# Patient Record
Sex: FEMALE | Employment: STUDENT | URBAN - METROPOLITAN AREA
[De-identification: names, ages, dates, MRNs, and addresses within clinical notes are randomized per-mention and may not be internally consistent; named-entity substitution may affect disease eponyms.]

---

## 2019-09-11 ENCOUNTER — HOSPITAL ENCOUNTER (OUTPATIENT)
Dept: RADIOLOGY | Facility: HOSPITAL | Age: 24
Discharge: HOME | End: 2019-09-11
Attending: ORTHOPAEDIC SURGERY
Payer: COMMERCIAL

## 2019-09-11 ENCOUNTER — OFFICE VISIT (OUTPATIENT)
Dept: SPORTS MEDICINE | Facility: CLINIC | Age: 24
End: 2019-09-11
Payer: COMMERCIAL

## 2019-09-11 VITALS — HEART RATE: 66 BPM | DIASTOLIC BLOOD PRESSURE: 74 MMHG | SYSTOLIC BLOOD PRESSURE: 116 MMHG

## 2019-09-11 DIAGNOSIS — M79.671 RIGHT FOOT PAIN: ICD-10-CM

## 2019-09-11 DIAGNOSIS — G89.29 CHRONIC PAIN OF RIGHT ANKLE: ICD-10-CM

## 2019-09-11 DIAGNOSIS — R93.89 ABNORMAL FINDINGS ON DIAGNOSTIC IMAGING OF OTHER SPECIFIED BODY STRUCTURES: ICD-10-CM

## 2019-09-11 DIAGNOSIS — S93.491A SPRAIN OF ANTERIOR TALOFIBULAR LIGAMENT OF RIGHT ANKLE, INITIAL ENCOUNTER: Primary | ICD-10-CM

## 2019-09-11 DIAGNOSIS — M79.671 RIGHT FOOT PAIN: Primary | ICD-10-CM

## 2019-09-11 DIAGNOSIS — M25.571 CHRONIC PAIN OF RIGHT ANKLE: ICD-10-CM

## 2019-09-11 PROCEDURE — 99999 PR PBB SHADOW E&M-NEW PATIENT-LVL III: CPT | Mod: PBBFAC,,, | Performed by: ORTHOPAEDIC SURGERY

## 2019-09-11 PROCEDURE — 99204 PR OFFICE/OUTPT VISIT, NEW, LEVL IV, 45-59 MIN: ICD-10-PCS | Mod: S$GLB,,, | Performed by: ORTHOPAEDIC SURGERY

## 2019-09-11 PROCEDURE — 99999 PR PBB SHADOW E&M-NEW PATIENT-LVL III: ICD-10-PCS | Mod: PBBFAC,,, | Performed by: ORTHOPAEDIC SURGERY

## 2019-09-11 PROCEDURE — 99204 OFFICE O/P NEW MOD 45 MIN: CPT | Mod: S$GLB,,, | Performed by: ORTHOPAEDIC SURGERY

## 2019-09-11 RX ORDER — MELOXICAM 15 MG/1
15 TABLET ORAL DAILY
Qty: 30 TABLET | Refills: 6 | Status: SHIPPED | OUTPATIENT
Start: 2019-09-11 | End: 2019-10-11

## 2019-09-11 RX ORDER — CELECOXIB 200 MG/1
200 CAPSULE ORAL 2 TIMES DAILY
Qty: 60 CAPSULE | Refills: 2 | Status: CANCELLED | OUTPATIENT
Start: 2019-09-11

## 2019-09-11 NOTE — PROGRESS NOTES
Subjective:          Chief Complaint: Paris Tomlin is a 23 y.o. female who had concerns including Pain of the Right Foot.    Ms. Tomlin is a 23-year-old  from [place].  She presents for evaluation of right ankle pain.  She reports that she sprained her right ankle 2 years ago, while [place] at [employer], and has been having dull pain over the anterolateral ankle ever since then.  She denies any recurrence ankle sprains.  She reports that she has pain after playing tennis or running more than 1.5 hr.  She has tried physical therapy exercises for the past year with her , which has not helped any.  She has not tried any anti-inflammatory medications. She had 1 shot in the area of the ATFL in August of this year.  The shot did not help.  She denies any knee or groin pain.  Denies any calf pain. She had an MRI and x-rays performed at [facility] in 4, presents today for evaluation of this right ankle pain.  Has been wearing CAM boot.        Review of Systems   Constitution: Negative.   HENT: Negative.    Eyes: Negative.    Cardiovascular: Negative.    Respiratory: Negative.    Endocrine: Negative.    Hematologic/Lymphatic: Negative.    Skin: Negative.    Musculoskeletal: Positive for joint pain.   Gastrointestinal: Negative.    Genitourinary: Negative.    Neurological: Negative.    Psychiatric/Behavioral: Negative.    Allergic/Immunologic: Negative.                    Objective:        General: Paris is well-developed, well-nourished, appears stated age, in no acute distress, alert and oriented to time, place and person.     General    Vitals reviewed.  Constitutional: She is oriented to person, place, and time. She appears well-developed and well-nourished. No distress.   HENT:   Mouth/Throat: No oropharyngeal exudate.   Eyes: Right eye exhibits no discharge. Left eye exhibits no discharge.   Neck: Normal range of motion.   Cardiovascular: Normal rate.    Pulmonary/Chest: Breath  sounds normal. No respiratory distress.   Neurological: She is alert and oriented to person, place, and time. She has normal reflexes. No cranial nerve deficit. Coordination normal.   Psychiatric: She has a normal mood and affect. Her behavior is normal. Judgment and thought content normal.     General Musculoskeletal Exam   Gait: normal     Right Ankle/Foot Exam     Inspection   Scars: absent  Deformity: absent  Erythema: absent  Bruising: Ankle - absent Foot - absent  Effusion: Ankle - absent Foot - absent  Atrophy: Ankle - absent Foot - absent    Pain   The patient exhibits pain of the anterior talofibular ligament.    Range of Motion   Ankle Joint   Dorsiflexion:  10 normal   Plantar flexion:  45 normal   Subtalar Joint   Inversion:  15 normal   Eversion:  5 normal   Sanchez Test:  negative  First MTP Joint: normal    Alignment   Knee Alignment: neutral  Hindfoot Alignment: neutral  Midfoot Alignment: normal  Forefoot Alignment: normal    Tests   Anterior drawer: 1+  Varus tilt: 1+  Heel Walk: able to perform  Tiptoe Walk: able to perform  Single Heel Rise: able to perform  External Rotation Test: negative  Squeeze Test: negative    Other   Ankle Crepitus: absent  Foot Crepitus:  absent  Sensation: normal  Peroneal Subluxation: negative    Comments:  TTP over the anterolateral talar dome    Left Ankle/Foot Exam     Inspection  Deformity: absent  Erythema: absent  Bruising: Ankle - absent Foot - absent  Effusion: Ankle - absent Foot - absent  Atrophy: Ankle - absent Foot - absent  Scars: absent    Range of Motion   Ankle Joint  Dorsiflexion:  10 normal   Plantar flexion:  35 normal     Subtalar Joint   Inversion:  15 normal   Eversion:  5 normal   Sanchez Test:  normal  First MTP Joint: normal    Alignment   Knee Alignment: neutral  Hindfoot Alignment: neutral  Midfoot Alignment: normal  Forefoot Alignment: normal    Tests   Anterior drawer: 1+  Varus tilt: 1+  Heel Walk: able to perform  Tiptoe Walk: able to  perform  Single Heel Rise: able to perform  External Rotation Test: negative  Squeeze Test: absent    Other   Foot Crepitus:  absent  Ankle Crepitus: absent  Sensation: normal  Peroneal Subluxation: negative      Muscle Strength   Right Lower Extremity   Anterior tibial:  5/5/5  Posterior tibial:  5/5/5  Gastrocsoleus:  5/5/5  Peroneal muscle:  5/5/5  EHL:  5/5  FDL: 5/5  EDL: 5/5  FHL: 5/5  Left Lower Extremity   Anterior tibial:  5/5/5   Posterior tibial:  5/5/5  Gastrocsoleus:  5/5/5  Peroneal muscle:  5/5/5  EHL:  5/5  FDL: 5/5  EDL: 5/5  FHL: 5/5    Reflexes     Left Side  Post Tibial:  2+  Achilles:  2+  Plantar Reflex: 2+    Right Side   Post Tibial:  2+  Achilles:  2+  Plantar Reflex: 2+    Vascular Exam     Right Pulses  Dorsalis Pedis:      2+  Posterior Tibial:      2+        Left Pulses  Dorsalis Pedis:      2+  Posterior Tibial:      2+        Edema  Right Lower Leg: absent  Left Lower Leg: absent    Radiographs ordered and reviewed today in clinic of the right ankle demonstrates no fracture, dislocation, swelling or degenerative changes noted.           Assessment:       Encounter Diagnoses   Name Primary?    Sprain of anterior talofibular ligament of right ankle, initial encounter Yes    Chronic pain of right ankle           Plan:         1. Ankle/foot function, SF-12 was filled out today in clinic.     RTC in 1 weeks w/Dr. Camarena for MRI review. Patient will not fill out Ankle/foot function, SF-12 on return.    2.  MRI of the Right ankle ordered    3.  Custom orthotics prescribed    4.  Mobic 15 mg QD prescribed              [facility] patient questionnaires have been collected today.

## 2019-09-11 NOTE — LETTER
Patient: Paris Tomlin   YOB: 1995   Clinic Number: [id number]   Today's Date: September 11, 2019        Certificate to Return to Sport/PE     Paris Dietrich was seen by Herrera Camarena MD on 9/11/2019.    Follow up RTC in 1 weeks w/Dr. Camarena for MRI review. Patient will not fill out Ankle/foot function, SF-12 on r, for RTC in 1 weeks w/Dr. Camarena for MRI review. Patient will not fill out Ankle/foot function, SF-12 on r. Paris Dietrich will be seen by Dr. Herrera Camarena.    Paris is to be withheld from activities until clear from physician.     Comments:     If you have any questions or concerns, please feel free to contact the office at [phone].    Thank you.    Herrera Camarena MD      Signature: __________________________________________________

## 2019-09-16 ENCOUNTER — TELEPHONE (OUTPATIENT)
Dept: SPORTS MEDICINE | Facility: CLINIC | Age: 24
End: 2019-09-16

## 2019-09-17 ENCOUNTER — TELEPHONE (OUTPATIENT)
Dept: SPORTS MEDICINE | Facility: CLINIC | Age: 24
End: 2019-09-17

## 2019-09-17 NOTE — TELEPHONE ENCOUNTER
Spoke to the patient and scheduled MRI for 5:45pm 9/19    ----- Message from Chanelle Lange sent at 9/17/2019 10:24 AM CDT -----  Contact: patient   Attn Tyra    Please call pt at [phone]    Patient is returning your call and requesting the 545 pm on this Thursday     Thank you

## 2019-09-17 NOTE — TELEPHONE ENCOUNTER
Spoke to the patient and tried to rescheduled her MRI. Pt requested to call back once she talks to her .    ----- Message from Crystal Rees MA sent at 9/13/2019  2:40 PM CDT -----  Regarding: reschedule MRI  Jesus called and found out a claim was submitted by [employer] for this injury so she can use the [employer] athletic insurance as the secondary. Please call to reschedule her MRIs.    Crystal Rees MA  [facility]

## 2019-09-20 ENCOUNTER — TELEPHONE (OUTPATIENT)
Dept: SPORTS MEDICINE | Facility: CLINIC | Age: 24
End: 2019-09-20

## 2019-10-21 DIAGNOSIS — S93.491A SPRAIN OF ANTERIOR TALOFIBULAR LIGAMENT OF RIGHT ANKLE, INITIAL ENCOUNTER: Primary | ICD-10-CM

## 2019-10-21 DIAGNOSIS — G89.29 CHRONIC PAIN OF TOE OF RIGHT FOOT: ICD-10-CM

## 2019-10-21 DIAGNOSIS — M79.674 CHRONIC PAIN OF TOE OF RIGHT FOOT: ICD-10-CM

## 2019-10-28 ENCOUNTER — TELEPHONE (OUTPATIENT)
Dept: ORTHOPEDICS | Facility: CLINIC | Age: 24
End: 2019-10-28

## 2019-10-28 ENCOUNTER — TELEPHONE (OUTPATIENT)
Dept: SPORTS MEDICINE | Facility: CLINIC | Age: 24
End: 2019-10-28

## 2019-10-28 NOTE — TELEPHONE ENCOUNTER
Called patient to inform her that her MRI is still under review and her appointment for the MRI must be rescheduled.     I informed patient that her MRI is under clinical review but she currently has not notes to review with Dr. Bolaños so there is a chance of denial due to no prior appointments with Dr. Bolaños. Patient informed me that she will not be seeing Dr. Bolaños for treatment because she has a provider in [place], [place]. Patient asked for the MRI to be rescheduled for after Nov 4, 2019. MRI appointment rescheduled for November 5, 2019 at 10:00 AM at the [facility]. Patient voiced understanding of appointment date, time, and location.           Evi San Staff             Dear Provider,     We are working on obtaining authorization for CHILANGO NIELSEN who is scheduled on 10/29/2019 for a MRI FOOT (HINDFOOT) RIGHT WITHOUT CONTRAST . We will continue to work with the insurance company to obtain authorization but need to know if the procedure is considered medically urgent, if it is urgent what is the reason or if it can be rescheduled to a later date if authorization is not obtained in time. If you have any questions, I can be reached at [phone] or ext [phone]. If you decide to reschedule the pt's appt please move it past November 4th as that is the last day the health plan has to approve or deny the request.     Kind Regards,   [employer]   Pre Service   [id number]

## 2019-10-28 NOTE — LETTER
October 28, 2019    Paris Tomlin  [address] [place] [address]             [address] [address]  Phone: [phone] To whom it may concern,    I am writing this letter on behalf of my patient Paris Tomlin whom I am treating for a right foot and ankle condition. I deem it medically necessary for her to receive an MRI of the right ankle for further evaluation. This is also considered a medically urgent study.    Please contact our office if you have any additional questions or concerns.    Thank you,    Herrera Camarena MD

## 2019-10-29 ENCOUNTER — HOSPITAL ENCOUNTER (OUTPATIENT)
Dept: RADIOLOGY | Facility: HOSPITAL | Age: 24
Discharge: HOME | End: 2019-10-29
Attending: ORTHOPAEDIC SURGERY
Payer: COMMERCIAL

## 2019-10-29 DIAGNOSIS — G89.29 CHRONIC PAIN OF TOE OF RIGHT FOOT: ICD-10-CM

## 2019-10-29 DIAGNOSIS — M79.674 CHRONIC PAIN OF TOE OF RIGHT FOOT: ICD-10-CM

## 2019-10-29 DIAGNOSIS — S93.491A SPRAIN OF ANTERIOR TALOFIBULAR LIGAMENT OF RIGHT ANKLE, INITIAL ENCOUNTER: ICD-10-CM

## 2019-10-29 PROCEDURE — 73718 MRI LOWER EXTREMITY W/O DYE: CPT | Mod: TC,RT

## 2019-10-29 PROCEDURE — 73718 MRI FOOT (HINDFOOT) RIGHT WITHOUT CONTRAST: ICD-10-PCS | Mod: 26,RT,, | Performed by: RADIOLOGY

## 2019-10-29 PROCEDURE — 73718 MRI LOWER EXTREMITY W/O DYE: CPT | Mod: 26,RT,, | Performed by: RADIOLOGY

## 2019-11-01 NOTE — PROGRESS NOTES
Subjective:        24F  at [facility] with a history of ankle pain returns for to discuss MRI results of right ankle/hindfoot    Chief Complaint: Paris Tomlin is a 24 y.o. female who had concerns including Follow-up and Pain of the Right Ankle.  Prior hx:   Ms. Tomlin is a 23-year-old  from [place].  She presents for evaluation of right ankle pain.  She reports that she sprained her right ankle 2 years ago, while playing at [employer], and has been having dull pain over the anterolateral ankle ever since then.  She denies any recurrence ankle sprains.  She reports that she has pain after playing tennis or running more than 1.5 hr.  She has tried physical therapy exercises for the past year with her , which has not helped any.  She has not tried any anti-inflammatory medications. She had 1 shot in the area of the ATFL in August of this year.  The shot did not help.  She denies any knee or groin pain.  Denies any calf pain. She had an MRI and x-rays performed at [facility] in 4, presents today for evaluation of this right ankle pain.  Has been wearing CAM boot.          Review of Systems   Constitution: Negative.   HENT: Negative.    Eyes: Negative.    Cardiovascular: Negative.    Respiratory: Negative.    Endocrine: Negative.    Hematologic/Lymphatic: Negative.    Skin: Negative.    Musculoskeletal: Positive for joint pain.   Gastrointestinal: Negative.    Genitourinary: Negative.    Neurological: Negative.    Psychiatric/Behavioral: Negative.    Allergic/Immunologic: Negative.                    Objective:        General: Paris is well-developed, well-nourished, appears stated age, in no acute distress, alert and oriented to time, place and person.     General    Vitals reviewed.  Constitutional: She is oriented to person, place, and time. She appears well-developed and well-nourished. No distress.   HENT:   Right Ear: External ear normal.   Left Ear: External ear  normal.   Nose: Nose normal.   Mouth/Throat: No oropharyngeal exudate.   Eyes: Pupils are equal, round, and reactive to light. Right eye exhibits no discharge. Left eye exhibits no discharge.   Neck: Normal range of motion.   Cardiovascular: Normal rate.    Pulmonary/Chest: Effort normal and breath sounds normal. No respiratory distress.   Abdominal: Soft.   Neurological: She is alert and oriented to person, place, and time. She has normal reflexes. No cranial nerve deficit. She exhibits normal muscle tone. Coordination normal.   Psychiatric: She has a normal mood and affect. Her behavior is normal. Judgment and thought content normal.     General Musculoskeletal Exam   Gait: normal     Right Ankle/Foot Exam     Inspection   Scars: absent  Deformity: absent  Erythema: absent  Bruising: Ankle - absent Foot - absent  Effusion: Ankle - absent Foot - absent  Atrophy: Ankle - absent Foot - absent    Pain   The patient exhibits pain of the anterior talofibular ligament.    Range of Motion   Ankle Joint   Dorsiflexion:  10 normal   Plantar flexion:  45 normal   Subtalar Joint   Inversion:  15 normal   Eversion:  5 normal   Sanchez Test:  negative  First MTP Joint: normal    Alignment   Knee Alignment: neutral  Hindfoot Alignment: neutral  Midfoot Alignment: normal  Forefoot Alignment: normal    Tests   Anterior drawer: 1+  Varus tilt: 1+  Heel Walk: able to perform  Tiptoe Walk: able to perform  Single Heel Rise: able to perform  External Rotation Test: negative  Squeeze Test: negative    Other   Ankle Crepitus: absent  Foot Crepitus:  absent  Sensation: normal  Peroneal Subluxation: negative    Comments:  TTP over the anterolateral talar dome    Left Ankle/Foot Exam     Inspection  Deformity: absent  Erythema: absent  Bruising: Ankle - absent Foot - absent  Effusion: Ankle - absent Foot - absent  Atrophy: Ankle - absent Foot - absent  Scars: absent    Range of Motion   Ankle Joint  Dorsiflexion:  10 normal   Plantar  flexion:  35 normal     Subtalar Joint   Inversion:  15 normal   Eversion:  5 normal   Sanchez Test:  normal  First MTP Joint: normal    Alignment   Knee Alignment: neutral  Hindfoot Alignment: neutral  Midfoot Alignment: normal  Forefoot Alignment: normal    Tests   Anterior drawer: 1+  Varus tilt: 1+  Heel Walk: able to perform  Tiptoe Walk: able to perform  Single Heel Rise: able to perform  External Rotation Test: negative  Squeeze Test: absent    Other   Foot Crepitus:  absent  Ankle Crepitus: absent  Sensation: normal  Peroneal Subluxation: negative      Right Hand/Wrist Exam     Inspection   Scars: Wrist - absent   Effusion: Wrist - absent   Bruising: Wrist - absent   Deformity: Wrist - deformity     Range of Motion     Wrist   Extension:  50 normal   Flexion:  70 normal   Abduction: 20 normal  Adduction: 35 normal    Tests   Phalens Sign: negative  Tinel's sign (median nerve): negative  Finkelstein's test: negative  Carpal Tunnel Compression Test: negative  Cubital Tunnel Compression Test: negative  LT Stability: negative  Birmingham's Test: negative      Other     Neuorologic Exam    Median Distribution: normal  Ulnar Distribution: normal  Radial Distribution: normal      Left Hand/Wrist Exam     Inspection   Scars: Wrist - absent   Effusion: Wrist - absent   Bruising: Wrist - absent   Deformity: Wrist - absent     Range of Motion     Wrist   Extension:  50 normal   Flexion:  70 normal   Abduction: 20 normal  Adduction: 35 normal    Tests   Phalens Sign: negative  Tinel's sign (median nerve): negative  Finkelstein's test: negative  Carpal Tunnel Compression Test: negative  Cubital Tunnel Compression Test: negative  LT Stability: negative  Birmingham's Test: negative      Other     Sensory Exam  Median Distribution: normal  Ulnar Distribution: normal  Radial Distribution: normal      Right Elbow Exam     Inspection   Scars: absent  Effusion: absent  Bruising: absent  Deformity: absent  Atrophy: absent    Range of  Motion   Extension:  0 normal   Flexion:  130 normal   Pronation: normal   Supination:  80 normal     Tests   Varus: negative  Valgus: negative  Tinel's sign (cubital tunnel): negative  Tennis Elbow: negative  Golfer's Elbow: negative  Radial Capitellar Grind: negative    Other   Sensation: normal      Left Elbow Exam     Inspection   Scars: absent  Effusion: absent  Bruising: absent  Deformity: absent  Atrophy: absent    Range of Motion   Extension:  0 normal   Flexion:  130 normal   Pronation: normal   Supination:  80 normal     Tests   Varus: negative  Tinel's sign (cubital tunnel): negative  Tennis Elbow: negative  Golfer's Elbow: negative  Radial Capitellar Grind: negative    Other   Sensation: normal        Muscle Strength   Right Upper Extremity   Wrist extension: 5/5/5   Wrist flexion: 5/5/5   : 5/5/5   Pinch Mechanism: 5/5  Elbow Pronation:  5/5   Elbow Supination:  5/5   Elbow Extension: 5/5  Elbow Flexion: 5/5  Intrinsics: 5/5  EPL (Extensor Pollicis Longus): 5/5  Left Upper Extremity  Wrist extension: 5/5/5   Wrist flexion: 5/5/5   :  5/5/5   Pinch Mechanism: 5/5  Elbow Pronation:  5/5   Elbow Supination:  5/5   Elbow Extension: 5/5  Elbow Flexion: 5/5  Intrinsics: 5/5  EPL (Extensor Pollicis Longus): 5/5  Right Lower Extremity   Anterior tibial:  5/5/5  Posterior tibial:  5/5/5  Gastrocsoleus:  5/5/5  Peroneal muscle:  5/5/5  EHL:  5/5  FDL: 5/5  EDL: 5/5  FHL: 5/5  Left Lower Extremity   Anterior tibial:  5/5/5   Posterior tibial:  5/5/5  Gastrocsoleus:  5/5/5  Peroneal muscle:  5/5/5  EHL:  5/5  FDL: 5/5  EDL: 5/5  FHL: 5/5    Reflexes     Left Side  Post Tibial:  2+  Achilles:  2+  Plantar Reflex: 2+    Right Side   Post Tibial:  2+  Achilles:  2+  Plantar Reflex: 2+    Vascular Exam     Right Pulses  Dorsalis Pedis:      2+  Posterior Tibial:      2+  Radial:                    2+      Left Pulses  Dorsalis Pedis:      2+  Posterior Tibial:      2+  Radial:                     2+      Capillary Refill  Right Hand: normal capillary refill  Left Hand: normal capillary refill  Right Mychal's Test: no rapid refill no slow refill  Left Mychal's Test: no rapid refill no slow refill    Edema  Right Forearm: absent  Right Lower Leg: absent  Left Forearm: absent  Left Lower Leg: absent    Radiographs ordered and reviewed today in clinic of the right ankle demonstrates no fracture, dislocation, swelling or degenerative changes noted.     MRI right hindfoot demonstrates  Ligaments: Anterior and posterior inferior tibiofibular ligaments are intact.  Anterior talofibular, calcaneofibular and posterior talofibular ligaments are intact.  Deep and superficial components of deltoid ligament are intact.  Spring ligament complex and Lisfranc ligament are intact.    Tendons: Medial ankle flexor, dorsal ankle extensor, and peroneus tendons are intact.  The Achilles tendon is intact.    Joints: No joint effusions. Tibiotalar and subtalar cartilage maintained.    Bones:  No fracture or infiltrative process.    Miscellaneous: Plantar fascia, sinus tarsi, and tarsal tunnel are unremarkable.          Assessment:       Encounter Diagnoses   Name Primary?    Chronic pain of right ankle     Sprain of anterior talofibular ligament of right ankle, initial encounter Yes    Chronic pain of both ankles           Plan:         1. Ankle/foot function, SF-12 was filled out today in clinic.     RTC in 6 weeks w/Dr. Camarena for follow-up. Patient will not fill out Ankle/foot function, SF-12 on return.    2.  MRI of the Right ankle ordered    3.  Custom orthotics prescribed    4.  Mobic 15 mg QD prescribed    5. Allow sports specific training transitioning to cutting, pivotting as tolerated              [facility] patient questionnaires have been collected today.

## 2019-11-01 NOTE — PROGRESS NOTES
Subjective:          Chief Complaint: Paris Tomlin is a 24 y.o. female who had no chief complaint listed for this encounter.    Ms. Tomlin is a 23-year-old  from [place].  She presents for evaluation of right ankle pain.  She reports that she sprained her right ankle 2 years ago, while playing at [employer], and has been having dull pain over the anterolateral ankle ever since then.  She denies any recurrence ankle sprains.  She reports that she has pain after playing tennis or running more than 1.5 hr.  She has tried physical therapy exercises for the past year with her , which has not helped any.  She has not tried any anti-inflammatory medications. She had 1 shot in the area of the ATFL in August of this year.  The shot did not help.  She denies any knee or groin pain.  Denies any calf pain. She had an MRI and x-rays performed at [facility] in 4, presents today for evaluation of this right ankle pain.  Has been wearing CAM boot.          Review of Systems   Constitution: Negative.   HENT: Negative.    Eyes: Negative.    Cardiovascular: Negative.    Respiratory: Negative.    Endocrine: Negative.    Hematologic/Lymphatic: Negative.    Skin: Negative.    Musculoskeletal: Positive for joint pain.   Gastrointestinal: Negative.    Genitourinary: Negative.    Neurological: Negative.    Psychiatric/Behavioral: Negative.    Allergic/Immunologic: Negative.                    Objective:        General: Paris is well-developed, well-nourished, appears stated age, in no acute distress, alert and oriented to time, place and person.     General    Vitals reviewed.  Constitutional: She is oriented to person, place, and time. She appears well-developed and well-nourished. No distress.   HENT:   Mouth/Throat: No oropharyngeal exudate.   Eyes: Right eye exhibits no discharge. Left eye exhibits no discharge.   Neck: Normal range of motion.   Cardiovascular: Normal rate.    Pulmonary/Chest:  Breath sounds normal. No respiratory distress.   Neurological: She is alert and oriented to person, place, and time. She has normal reflexes. No cranial nerve deficit. Coordination normal.   Psychiatric: She has a normal mood and affect. Her behavior is normal. Judgment and thought content normal.     General Musculoskeletal Exam   Gait: normal     Right Ankle/Foot Exam     Inspection   Scars: absent  Deformity: absent  Erythema: absent  Bruising: Ankle - absent Foot - absent  Effusion: Ankle - absent Foot - absent  Atrophy: Ankle - absent Foot - absent    Pain   The patient exhibits pain of the anterior talofibular ligament.    Range of Motion   Ankle Joint   Dorsiflexion:  10 normal   Plantar flexion:  45 normal   Subtalar Joint   Inversion:  15 normal   Eversion:  5 normal   Sanchez Test:  negative  First MTP Joint: normal    Alignment   Knee Alignment: neutral  Hindfoot Alignment: neutral  Midfoot Alignment: normal  Forefoot Alignment: normal    Tests   Anterior drawer: 1+  Varus tilt: 1+  Heel Walk: able to perform  Tiptoe Walk: able to perform  Single Heel Rise: able to perform  External Rotation Test: negative  Squeeze Test: negative    Other   Ankle Crepitus: absent  Foot Crepitus:  absent  Sensation: normal  Peroneal Subluxation: negative    Comments:  TTP over the anterolateral talar dome    Left Ankle/Foot Exam     Inspection  Deformity: absent  Erythema: absent  Bruising: Ankle - absent Foot - absent  Effusion: Ankle - absent Foot - absent  Atrophy: Ankle - absent Foot - absent  Scars: absent    Range of Motion   Ankle Joint  Dorsiflexion:  10 normal   Plantar flexion:  35 normal     Subtalar Joint   Inversion:  15 normal   Eversion:  5 normal   Sanchez Test:  normal  First MTP Joint: normal    Alignment   Knee Alignment: neutral  Hindfoot Alignment: neutral  Midfoot Alignment: normal  Forefoot Alignment: normal    Tests   Anterior drawer: 1+  Varus tilt: 1+  Heel Walk: able to perform  Tiptoe Walk:  able to perform  Single Heel Rise: able to perform  External Rotation Test: negative  Squeeze Test: absent    Other   Foot Crepitus:  absent  Ankle Crepitus: absent  Sensation: normal  Peroneal Subluxation: negative      Muscle Strength   Right Lower Extremity   Anterior tibial:  5/5/5  Posterior tibial:  5/5/5  Gastrocsoleus:  5/5/5  Peroneal muscle:  5/5/5  EHL:  5/5  FDL: 5/5  EDL: 5/5  FHL: 5/5  Left Lower Extremity   Anterior tibial:  5/5/5   Posterior tibial:  5/5/5  Gastrocsoleus:  5/5/5  Peroneal muscle:  5/5/5  EHL:  5/5  FDL: 5/5  EDL: 5/5  FHL: 5/5    Reflexes     Left Side  Post Tibial:  2+  Achilles:  2+  Plantar Reflex: 2+    Right Side   Post Tibial:  2+  Achilles:  2+  Plantar Reflex: 2+    Vascular Exam     Right Pulses  Dorsalis Pedis:      2+  Posterior Tibial:      2+        Left Pulses  Dorsalis Pedis:      2+  Posterior Tibial:      2+        Edema  Right Lower Leg: absent  Left Lower Leg: absent    Radiographs ordered and reviewed today in clinic of the right ankle demonstrates no fracture, dislocation, swelling or degenerative changes noted.           Assessment:       No diagnosis found.       Plan:         1. Ankle/foot function, SF-12 was filled out today in clinic.     RTC in 1 weeks w/Dr. Camarena for MRI review. Patient will not fill out Ankle/foot function, SF-12 on return.    2.  MRI of the Right ankle ordered    3.  Custom orthotics prescribed    4.  Mobic 15 mg QD prescribed              [facility] patient questionnaires have been collected today.

## 2019-11-04 ENCOUNTER — OFFICE VISIT (OUTPATIENT)
Dept: SPORTS MEDICINE | Facility: CLINIC | Age: 24
End: 2019-11-04
Payer: COMMERCIAL

## 2019-11-04 VITALS
HEART RATE: 68 BPM | DIASTOLIC BLOOD PRESSURE: 73 MMHG | SYSTOLIC BLOOD PRESSURE: 113 MMHG | WEIGHT: 160 LBS | BODY MASS INDEX: 22.9 KG/M2 | HEIGHT: 70 IN

## 2019-11-04 DIAGNOSIS — S93.491A SPRAIN OF ANTERIOR TALOFIBULAR LIGAMENT OF RIGHT ANKLE, INITIAL ENCOUNTER: Primary | ICD-10-CM

## 2019-11-04 DIAGNOSIS — G89.29 CHRONIC PAIN OF BOTH ANKLES: ICD-10-CM

## 2019-11-04 DIAGNOSIS — M25.571 CHRONIC PAIN OF BOTH ANKLES: ICD-10-CM

## 2019-11-04 DIAGNOSIS — M25.572 CHRONIC PAIN OF BOTH ANKLES: ICD-10-CM

## 2019-11-04 DIAGNOSIS — M25.571 CHRONIC PAIN OF RIGHT ANKLE: ICD-10-CM

## 2019-11-04 DIAGNOSIS — G89.29 CHRONIC PAIN OF RIGHT ANKLE: ICD-10-CM

## 2019-11-04 PROCEDURE — 99214 OFFICE O/P EST MOD 30 MIN: CPT | Mod: S$GLB,,, | Performed by: ORTHOPAEDIC SURGERY

## 2019-11-04 PROCEDURE — 99999 PR PBB SHADOW E&M-EST. PATIENT-LVL III: ICD-10-PCS | Mod: PBBFAC,,, | Performed by: ORTHOPAEDIC SURGERY

## 2019-11-04 PROCEDURE — 99214 PR OFFICE/OUTPT VISIT, EST, LEVL IV, 30-39 MIN: ICD-10-PCS | Mod: S$GLB,,, | Performed by: ORTHOPAEDIC SURGERY

## 2019-11-04 PROCEDURE — 99999 PR PBB SHADOW E&M-EST. PATIENT-LVL III: CPT | Mod: PBBFAC,,, | Performed by: ORTHOPAEDIC SURGERY

## 2019-11-04 RX ORDER — MELOXICAM 15 MG/1
15 TABLET ORAL DAILY
Qty: 30 TABLET | Refills: 2 | Status: SHIPPED | OUTPATIENT
Start: 2019-11-04 | End: 2019-12-04

## 2019-11-04 NOTE — PATIENT INSTRUCTIONS
Plantarflexion (Strength)    These instructions are for your right ankle. Switch sides for your left ankle.  1. Sit on a bed or the floor with your right leg out straight.  2. Loop an elastic exercise band or tubing around your right foot. Hold the ends in your hands.  3. Push on the elastic band with the ball of your foot, pointing your toes. Pull the elastic band toward as you do this. Hold for 5 seconds. Then move your foot back to the starting position.  4. Repeat 10 times, or as instructed.  5. Do this exercise 3 times a day, or as instructed.  Date Last Reviewed: 3/10/2016  © 8737-4258 [employer]. [address] [place] [address]. All rights reserved. This information is not intended as a substitute for professional medical care. Always follow your healthcare professional's instructions.        Calf Raise (Strength)    6. Stand up straight with both feet flat on the floor, slightly apart. Hold onto a sturdy chair, railing, counter, or table.  7. Raise both heels so youre standing on the balls of your feet. Dont lock your knees or arch your back. Hold for 5 seconds. Then slowly lower your heels back down to the floor.  8. Repeat 10 times, or as instructed.  9. Do this exercise 3 times a day, or as instructed.     Challenge yourself  As you become stronger, do this exercise on one foot at a time.   Date Last Reviewed: 3/10/2016  © 3596-5249 [employer]. [address] [place] [address]. All rights reserved. This information is not intended as a substitute for professional medical care. Always follow your healthcare professional's instructions.        Ankle Alphabet (Flexibility)    These instructions are for your right foot. Switch sides for your left foot.  10. Sit on the floor with your legs straight in front of you.  11. Rest your right calf on a rolled-up towel. Use your foot to write the letters of the alphabet in mid-air.  12. Repeat this exercise 3 times  a day, or as instructed.  Date Last Reviewed: 3/10/2016  © 8461-7689 [employer]. [address] [place] [address]. All rights reserved. This information is not intended as a substitute for professional medical care. Always follow your healthcare professional's instructions.        Foot and Ankle Exercises: Ankle Circles    This exercise is designed to stretch and strengthen your feet and ankles. Before beginning the exercise, read through all the instructions. While exercising, breathe normally. If you feel any pain, stop the exercise. If pain persists, inform your healthcare provider.  · Sit straight-legged on the floor or other firm surface.  · Resting your ______ calf on a rolled-up towel, use your foot to draw circles in both directions or write the letters of the alphabet in the air.  · Continue for ______ seconds. Do ______ times a day.  Date Last Reviewed: 9/9/2015 © 2000-2017 [employer]. [address] [place] [address]. All rights reserved. This information is not intended as a substitute for professional medical care. Always follow your healthcare professional's instructions.        Bent-Knee Calf Stretch    This exercise is designed to stretch and strengthen your feet and ankles. Before beginning the exercise, read through all the instructions. While exercising, breathe normally and dont bounce. If you feel any pain, stop the exercise. If pain persists, inform your healthcare provider:  · Stand an arms length away from a wall. Place the palms of your hands on the wall. Step forward about 12 inches with your ______ foot.  · Keeping toes pointed forward and both heels on the floor, bend both knees and lean forward. Hold for ______ seconds. Relax.  · Repeat ______ times. Do ______ sets a day.  Date Last Reviewed: 8/16/2015  © 5467-2692 [employer]. [address] [place] [address]. All rights reserved. This information is not intended  as a substitute for professional medical care. Always follow your healthcare professional's instructions.      Allow sports specific training transitioning to cutting, pivotting as tolerated